# Patient Record
Sex: MALE | Race: OTHER
[De-identification: names, ages, dates, MRNs, and addresses within clinical notes are randomized per-mention and may not be internally consistent; named-entity substitution may affect disease eponyms.]

---

## 2020-10-13 ENCOUNTER — HOSPITAL ENCOUNTER (OUTPATIENT)
Dept: HOSPITAL 76 - DI | Age: 70
Discharge: HOME | End: 2020-10-13
Attending: STUDENT IN AN ORGANIZED HEALTH CARE EDUCATION/TRAINING PROGRAM
Payer: MEDICARE

## 2020-10-13 DIAGNOSIS — K57.30: Primary | ICD-10-CM

## 2020-10-13 PROCEDURE — 74177 CT ABD & PELVIS W/CONTRAST: CPT

## 2020-10-13 NOTE — CT REPORT
PROCEDURE:  Abdomen/Pelvis W

 

INDICATIONS:  LLQ ABD PAIN

 

CONTRAST:  IV CONTRAST: Optiray 320 ml: 100 PO CONTRAST: Optiray 320 ml50

 

TECHNIQUE:  

After the administration of IV and oral contrast, 5 mm thick sections acquired from the diaphragms to
 the symphysis.  5 mm thick coronal and sagittal reformats were acquired.  For radiation dose reducti
on, the following was used:  automated exposure control, adjustment of mA and/or kV according to chanda
ent size.  

 

COMPARISON:  None.

 

FINDINGS:  

Image quality:  Excellent.  

 

ABDOMEN:  

Lung bases:  Lung bases are clear.  Heart size is normal.  

 

Solid organs:  Liver and spleen are normal in size and enhancement.  Gallbladder is within normal de leon
its  Biliary system is non dilated.  Pancreas enhances normally.  No adrenal nodules.  Kidneys demons
trate normal size and enhancement, without hydronephrosis.  

 

Peritoneum and bowel: There is diverticulosis of the descending and sigmoid colon. Bowel loops demons
trate otherwise normal wall thickness and caliber.  Normal appendix. No free fluid or air.  

 

Nodes and vessels:  No retroperitoneal or mesenteric adenopathy by size criteria.  Aorta and inferior
 vena cava are normal in size.  

 

Miscellaneous:  No ventral hernias.  

 

 

PELVIS:  

Genitourinary:  Bladder wall thickness is normal.  

 

Miscellaneous:  No inguinal hernias or adenopathy.  

 

Bones:  No suspicious bony lesions.  No vertebral body compression fractures.  

 

IMPRESSION:  

1. No acute process.

2. Normal appendix.

3. Colonic diverticulosis with no evidence of acute diverticulitis.

 

Reviewed by: Sagrario Salazar MD on 10/13/2020 4:10 PM PDT

Approved by: Sagrario Salazar MD on 10/13/2020 4:10 PM PDT

 

 

Station ID:  [id number]

## 2020-11-18 ENCOUNTER — HOSPITAL ENCOUNTER (OUTPATIENT)
Dept: HOSPITAL 76 - SDS | Age: 70
Discharge: HOME | End: 2020-11-18
Attending: SURGERY
Payer: MEDICARE

## 2020-11-18 ENCOUNTER — HOSPITAL ENCOUNTER (OUTPATIENT)
Dept: HOSPITAL 76 - EMS | Age: 70
Discharge: ANOTHER HOSPITAL | End: 2020-11-18
Attending: SURGERY
Payer: MEDICARE

## 2020-11-18 ENCOUNTER — HOSPITAL ENCOUNTER (OUTPATIENT)
Dept: HOSPITAL 76 - ED | Age: 70
LOS: 1 days | Discharge: HOME | End: 2020-11-19
Attending: SURGERY
Payer: MEDICARE

## 2020-11-18 VITALS — DIASTOLIC BLOOD PRESSURE: 79 MMHG | SYSTOLIC BLOOD PRESSURE: 160 MMHG

## 2020-11-18 DIAGNOSIS — Z20.828: ICD-10-CM

## 2020-11-18 DIAGNOSIS — R11.0: ICD-10-CM

## 2020-11-18 DIAGNOSIS — K57.30: ICD-10-CM

## 2020-11-18 DIAGNOSIS — K64.8: ICD-10-CM

## 2020-11-18 DIAGNOSIS — K62.5: Primary | ICD-10-CM

## 2020-11-18 DIAGNOSIS — E66.9: ICD-10-CM

## 2020-11-18 DIAGNOSIS — F17.210: ICD-10-CM

## 2020-11-18 DIAGNOSIS — K62.1: ICD-10-CM

## 2020-11-18 DIAGNOSIS — D12.5: ICD-10-CM

## 2020-11-18 DIAGNOSIS — D12.5: Primary | ICD-10-CM

## 2020-11-18 DIAGNOSIS — K91.840: Primary | ICD-10-CM

## 2020-11-18 DIAGNOSIS — I10: ICD-10-CM

## 2020-11-18 LAB
ALBUMIN DIAFP-MCNC: 3.8 G/DL (ref 3.2–5.5)
ALBUMIN/GLOB SERPL: 1.2 {RATIO} (ref 1–2.2)
ALP SERPL-CCNC: 44 IU/L (ref 42–121)
ALT SERPL W P-5'-P-CCNC: 18 IU/L (ref 10–60)
ANION GAP SERPL CALCULATED.4IONS-SCNC: 12 MMOL/L (ref 6–13)
AST SERPL W P-5'-P-CCNC: 17 IU/L (ref 10–42)
BASOPHILS NFR BLD AUTO: 0.1 10^3/UL (ref 0–0.1)
BASOPHILS NFR BLD AUTO: 0.4 %
BILIRUB BLD-MCNC: 0.6 MG/DL (ref 0.2–1)
BUN SERPL-MCNC: 19 MG/DL (ref 6–20)
C PNEUM DNA NPH QL NAA+NON-PROBE: NOT DETECTED
CALCIUM UR-MCNC: 8.7 MG/DL (ref 8.5–10.3)
CHLORIDE SERPL-SCNC: 104 MMOL/L (ref 101–111)
CO2 SERPL-SCNC: 22 MMOL/L (ref 21–32)
CREAT SERPLBLD-SCNC: 1.3 MG/DL (ref 0.6–1.2)
EOSINOPHIL # BLD AUTO: 0.1 10^3/UL (ref 0–0.7)
EOSINOPHIL NFR BLD AUTO: 0.6 %
ERYTHROCYTE [DISTWIDTH] IN BLOOD BY AUTOMATED COUNT: 13.2 % (ref 12–15)
GLOBULIN SER-MCNC: 3.3 G/DL (ref 2.1–4.2)
GLUCOSE SERPL-MCNC: 163 MG/DL (ref 70–100)
HGB UR QL STRIP: 12.4 G/DL (ref 14–18)
INR PPP: 1.1 (ref 0.8–1.2)
LIPASE SERPL-CCNC: 28 U/L (ref 22–51)
LYMPHOCYTES # SPEC AUTO: 5.6 10^3/UL (ref 1.5–3.5)
LYMPHOCYTES NFR BLD AUTO: 34.8 %
MANUAL DIF COMMENT BLD-IMP: (no result)
MCH RBC QN AUTO: 32 PG (ref 27–31)
MCHC RBC AUTO-ENTMCNC: 32.1 G/DL (ref 32–36)
MCV RBC AUTO: 99.7 FL (ref 80–94)
MONOCYTES # BLD AUTO: 1 10^3/UL (ref 0–1)
MONOCYTES NFR BLD AUTO: 5.9 %
NEUTROPHILS # BLD AUTO: 9.3 10^3/UL (ref 1.5–6.6)
NEUTROPHILS # SNV AUTO: 16.1 X10^3/UL (ref 4.8–10.8)
NEUTROPHILS NFR BLD AUTO: 57.4 %
PDW BLD AUTO: 9.4 FL (ref 7.4–11.4)
PLATELET # BLD: 265 10^3/UL (ref 130–450)
PLATELET BLD QL SMEAR: (no result)
PROT SPEC-MCNC: 7.1 G/DL (ref 6.7–8.2)
PROTHROM ACT/NOR PPP: 12 SECS (ref 9.9–12.6)
RBC MAR: 3.87 10^6/UL (ref 4.7–6.1)
RBC MORPH BLD: (no result)
SODIUM SERPLBLD-SCNC: 138 MMOL/L (ref 135–145)

## 2020-11-18 PROCEDURE — 87631 RESP VIRUS 3-5 TARGETS: CPT

## 2020-11-18 PROCEDURE — 0DBN8ZZ EXCISION OF SIGMOID COLON, VIA NATURAL OR ARTIFICIAL OPENING ENDOSCOPIC: ICD-10-PCS | Performed by: SURGERY

## 2020-11-18 PROCEDURE — 83690 ASSAY OF LIPASE: CPT

## 2020-11-18 PROCEDURE — 74021 RADEX ABDOMEN 3+ VIEWS: CPT

## 2020-11-18 PROCEDURE — 0202U NFCT DS 22 TRGT SARS-COV-2: CPT

## 2020-11-18 PROCEDURE — 86900 BLOOD TYPING SEROLOGIC ABO: CPT

## 2020-11-18 PROCEDURE — 80053 COMPREHEN METABOLIC PANEL: CPT

## 2020-11-18 PROCEDURE — 85025 COMPLETE CBC W/AUTO DIFF WBC: CPT

## 2020-11-18 PROCEDURE — 86901 BLOOD TYPING SEROLOGIC RH(D): CPT

## 2020-11-18 PROCEDURE — 86850 RBC ANTIBODY SCREEN: CPT

## 2020-11-18 PROCEDURE — 36415 COLL VENOUS BLD VENIPUNCTURE: CPT

## 2020-11-18 PROCEDURE — 45382 COLONOSCOPY W/CONTROL BLEED: CPT

## 2020-11-18 PROCEDURE — 85610 PROTHROMBIN TIME: CPT

## 2020-11-18 NOTE — ANESTHESIA
Pre-Anesthesia VS, & Labs





- Diagnosis





Acute GI bleed, S/P colonoscopy polypectomy





- Procedure





Colonoscopy


Vital Signs: 





                                        











Temp Pulse Resp BP Pulse Ox


 


 37.1 C   95   14   140/83 H  99 


 


 11/18/20 20:40  11/18/20 21:56  11/18/20 21:56  11/18/20 21:56  11/18/20 21:56











Height: 5 ft 4 in


Weight (kg): 83.007 kg


Body Mass Index: 31.4


BMI Classification: Obese





- NPO


>8 hours





- Lab Results


Current Lab Results: 





Laboratory Tests





11/18/20 20:50: PT 12.0, INR 1.1


11/18/20 20:50: Blood Type A POSITIVE, Antibody Screen NEGATIVE


11/18/20 20:47: Sodium 138, Potassium 4.2, Chloride 104, Carbon Dioxide 22, 

Anion Gap 12.0, BUN 19, Creatinine 1.3 H, Estimated GFR (MDRD) 54 L, Glucose 163

H, Calcium 8.7, Total Bilirubin 0.6, AST 17, ALT 18, Alkaline Phosphatase 44, 

Total Protein 7.1, Albumin 3.8, Globulin 3.3, Albumin/Globulin Ratio 1.2, Lipase

28


11/18/20 20:47: WBC 16.1 H, RBC 3.87 L, Hgb 12.4 L, Hct 38.6 L, MCV 99.7 H, MCH 

32.0 H, MCHC 32.1, RDW 13.2, Plt Count 265, MPV 9.4, Neut # (Auto) 9.3 H, Lymph 

# (Auto) 5.6 H, Mono # (Auto) 1.0, Eos # (Auto) 0.1, Baso # (Auto) 0.1, Absolute

Nucleated RBC 0.00, Band Neuts % (Manual) Not Reportable, Abnorm Lymph % 

(Manual) Not Reportable, Nucleated RBC % 0.0, Neutrophils # (Manual) Not Rep

ortable, Lymphocytes # (Manual) Not Reportable, Monocytes # (Manual) Not 

Reportable, Eosinophils # (Manual) Not Reportable, Basophils # (Manual) Not 

Reportable, Differential Comment MANUAL=AUTO DIFF, Manual Slide Review 

Indicated, Platelet Estimate NORMAL (130-450,000), RBC Morph Micro Appear NORMAL

APPEARANCE








Fish Bones: 


                                 11/18/20 20:47





                                 11/18/20 20:47





Home Medications and Allergies





                                        





Finasteride 5 mg PO DAILY 09/08/17 


Tamsulosin HCl [Flomax] 0.4 mg ORAL DAILY 09/08/17 


lisinopriL [Lisinopril] 10 mg ORAL DAILY 09/08/17 


FLUoxetine [PROzac] 20 mg PO DAILY 11/18/20 








Allergies/Adverse Reactions: 


                                    Allergies











Allergy/AdvReac Type Severity Reaction Status Date / Time


 


codeine Allergy  Nausea Verified 11/18/20 20:51














Anes History & Medical History





- Anesthetic History


Anesthesia Complications: reports: No previous complications


Family history of Anesthesia Complications: Denies


Family history of Malignant Hyperthermia: Denies





- Medical History


Cardiovascular: reports: Hypertension


Pulmonary: reports: None


Gastrointestinal: reports: Hepatitis


Urinary: reports: Benign prostate hypertrophy


Musculoskeletal: reports: Other


Endocrine/Autoimmune: reports: None


Skin: reports: None


Smoking Status: Current every day smoker


Psychosocial: reports: Depression, Anxiety (PTSD), Substance abuse, Other 

([place])





- Surgical History


General: Colonoscopy





Exam


General: Alert, Oriented x3, Cooperative


Dental: Dentures full Upper, Partials Lower, Poor dentition, Other (Missing 

teeth lower)


Mouth Opening: 3 Fingerbreadth


Neck Mobility: Reduced


Mallampati classification: III


Thyromental Distance: 4-6 cm


Respiratory: Lungs clear





Plan


Anesthesia Type: MAC


Consent for Procedure(s) Verified and Reviewed: Yes


Code Status: Attempt Resuscitation


ASA classification: 2-Mild systemic disease


Is this case an emergency?: Yes (Anesthesia plan explained, consent signed.)

## 2020-11-18 NOTE — ED PHYSICIAN DOCUMENTATION
PD HPI GI BLEED





- Stated complaint


Stated Complaint: GI BLEED





- Chief complaint


Chief Complaint: Abd Pain





- History obtained from


History obtained from: Patient





- Additional information


Additional information: 


Pt is brought by EMS after onset of fior blood per rectum this afternoon. 

Patient states he had a colonoscopy today with Dr. Delacruz and had a couple of 

polyps removed and some biopsies taken. The patient states that he felt fine 

after the colonoscopy, and went home and ate some yogurt and a small bit of 

other food. He felt the urge to have a bowel movement and when he did, he 

noticed fior blood with clots. Patient states it is hard to quantify exactly 

how much came out, but the the toilet bowl turned red. Patient states this is 

happened a total of 6 times between afternoon and evening. He states that what 

really concerned him was that he suddenly became lightheaded and nauseated and 

actually vomited.  Patient states his wife told him he looked very pale at this 

point in time.  He also found a friend who had just had a large mass removed 

from his colon, and when his friend told him that he did not have any heavy 

bleeding, but only some minor residue with his stools, the patient became very 

concerned. She is no longer feeling lightheaded or nauseated, but medics to 

report the patient has been tachycardic in route in the 110's. The patient is 

not on any anticoagulants. He got the colonoscopy in the first place not for 

bleeding, but because he had had some change in his bowel habits and he was 

nearly due for colonoscopy anyway, so his doctor set this up for him. Patient 

states the procedure was done here.








Review of Systems


Ten Systems: 10 systems reviewed and negative


Constitutional: reports: Reviewed and negative


Eyes: reports: Reviewed and negative


Ears: reports: Reviewed and negative


Nose: reports: Reviewed and negative


Throat: reports: Reviewed and negative


Cardiac: reports: Reviewed and negative


Respiratory: reports: Reviewed and negative


GI: reports: Nausea, Vomiting, Bloody / black stool.  denies: Abdominal Pain, 

Hematemesis


: reports: Reviewed and negative


Skin: reports: Reviewed and negative


Musculoskeletal: reports: Reviewed and negative


Neurologic: reports: Reviewed and negative


Psychiatric: reports: Reviewed and negative


Endocrine: reports: Reviewed and negative


Immunocompromised: reports: Reviewed and negative





PD PAST MEDICAL HISTORY





- Past Medical History


Cardiovascular: Hypertension


Respiratory: None


Endocrine/Autoimmune: None


GI: Hepatitis


: Benign prostate hypertrophy


HEENT: Chronic vision loss


Psych: Depression, Post traumatic stress disorder


Musculoskeletal: Other


Derm: None





- Past Surgical History


Past Surgical History: Yes


General: Colonoscopy





- Present Medications


Home Medications: 


                                Ambulatory Orders











 Medication  Instructions  Recorded  Confirmed


 


Finasteride 5 mg PO DAILY 09/08/17 11/17/20


 


Tamsulosin HCl [Flomax] 0.4 mg ORAL DAILY 09/08/17 11/17/20


 


lisinopriL [Lisinopril] 10 mg ORAL DAILY 09/08/17 11/17/20


 


FLUoxetine [PROzac] 20 mg PO DAILY 11/18/20 11/18/20














- Allergies


Allergies/Adverse Reactions: 


                                    Allergies











Allergy/AdvReac Type Severity Reaction Status Date / Time


 


codeine Allergy  Nausea Verified 11/18/20 20:51














- Social History


Does the pt smoke?: No


Smoking Status: Never smoker





PD ED PE NORMAL





- Vitals


Vital signs reviewed: Yes





- General


General: Alert and oriented X 3, No acute distress





- HEENT


HEENT: Atraumatic, PERRL, EOMI, Moist mucous membranes





- Neck


Neck: Supple, no meningeal sign





- Cardiac


Cardiac: RRR, No murmur, Strong equal pulses





- Respiratory


Respiratory: No respiratory distress, Clear bilaterally





- Abdomen


Abdomen: Soft, Non tender, Non distended





- Back


Back: No CVA TTP





- Derm


Derm: Normal color, Warm and dry, No rash





- Extremities


Extremities: No deformity, No edema





- Neuro


Neuro: Alert and oriented X 3, Other (The normal)





- Psych


Psych: Normal mood, Normal affect





Results





- Vitals


Vitals: 


                               Vital Signs - 24 hr











  11/18/20 11/18/20 11/18/20





  20:40 21:17 21:56


 


Temperature 37.1 C  


 


Heart Rate 116 H 91 95


 


Respiratory 19 18 14





Rate   


 


Blood Pressure 158/107 H 111/87 H 140/83 H


 


O2 Saturation 100 100 99








                                     Oxygen











O2 Source                      Room air

















- Labs


Labs: 


                                Laboratory Tests











  11/18/20 11/18/20 11/18/20





  20:47 20:47 20:50


 


WBC  16.1 H  


 


RBC  3.87 L  


 


Hgb  12.4 L  


 


Hct  38.6 L  


 


MCV  99.7 H  


 


MCH  32.0 H  


 


MCHC  32.1  


 


RDW  13.2  


 


Plt Count  265  


 


MPV  9.4  


 


Neut # (Auto)  9.3 H  


 


Lymph # (Auto)  5.6 H  


 


Mono # (Auto)  1.0  


 


Eos # (Auto)  0.1  


 


Baso # (Auto)  0.1  


 


Absolute Nucleated RBC  0.00  


 


Band Neuts % (Manual)  Not Reportable  


 


Abnorm Lymph % (Manual)  Not Reportable  


 


Nucleated RBC %  0.0  


 


Neutrophils # (Manual)  Not Reportable  


 


Lymphocytes # (Manual)  Not Reportable  


 


Monocytes # (Manual)  Not Reportable  


 


Eosinophils # (Manual)  Not Reportable  


 


Basophils # (Manual)  Not Reportable  


 


Differential Comment  MANUAL=AUTO DIFF  


 


Manual Slide Review  Indicated  


 


Platelet Estimate  NORMAL (130-450,000)  


 


RBC Morph Micro Appear  NORMAL APPEARANCE  


 


PT   


 


INR   


 


Sodium   138 


 


Potassium   4.2 


 


Chloride   104 


 


Carbon Dioxide   22 


 


Anion Gap   12.0 


 


BUN   19 


 


Creatinine   1.3 H 


 


Estimated GFR (MDRD)   54 L 


 


Glucose   163 H 


 


Calcium   8.7 


 


Total Bilirubin   0.6 


 


AST   17 


 


ALT   18 


 


Alkaline Phosphatase   44 


 


Total Protein   7.1 


 


Albumin   3.8 


 


Globulin   3.3 


 


Albumin/Globulin Ratio   1.2 


 


Lipase   28 


 


Blood Type    A POSITIVE


 


Antibody Screen    NEGATIVE














  11/18/20





  20:50


 


WBC 


 


RBC 


 


Hgb 


 


Hct 


 


MCV 


 


MCH 


 


MCHC 


 


RDW 


 


Plt Count 


 


MPV 


 


Neut # (Auto) 


 


Lymph # (Auto) 


 


Mono # (Auto) 


 


Eos # (Auto) 


 


Baso # (Auto) 


 


Absolute Nucleated RBC 


 


Band Neuts % (Manual) 


 


Abnorm Lymph % (Manual) 


 


Nucleated RBC % 


 


Neutrophils # (Manual) 


 


Lymphocytes # (Manual) 


 


Monocytes # (Manual) 


 


Eosinophils # (Manual) 


 


Basophils # (Manual) 


 


Differential Comment 


 


Manual Slide Review 


 


Platelet Estimate 


 


RBC Morph Micro Appear 


 


PT  12.0


 


INR  1.1


 


Sodium 


 


Potassium 


 


Chloride 


 


Carbon Dioxide 


 


Anion Gap 


 


BUN 


 


Creatinine 


 


Estimated GFR (MDRD) 


 


Glucose 


 


Calcium 


 


Total Bilirubin 


 


AST 


 


ALT 


 


Alkaline Phosphatase 


 


Total Protein 


 


Albumin 


 


Globulin 


 


Albumin/Globulin Ratio 


 


Lipase 


 


Blood Type 


 


Antibody Screen 














PD MEDICAL DECISION MAKING





- ED course


Complexity details: reviewed results, re-evaluated patient, considered 

differential, d/w patient


ED course: 


The patient was started on IV fluids, as he was found to be tachycardic here in 

the emergency department. He was not hypotensive. He was worked up with labs, 

including CBC, CMP, PT/INR, and type and screen.  The patient's heart rate 

improved greatly with fluids and was found to be normal on re-evaluation.  His 

blood pressure remained stable throughout his stay in the emergency department. 

CBC showed a hemoglobin of 12.4, and no comparisons were available in the 

system.  I spoke with Dr. Galaviz, who is on-call for surgery, and she did call the

patient's surgeon, Dr. Buenrostro, who is out of our system.  He did come and see 

the patient, and decided to take him back to the OR for another scope.








Departure





- Departure


Disposition: ED Transfer to [facility]


Clinical Impression: 


 Lower GI bleed





Condition: Serious

## 2020-11-19 VITALS — SYSTOLIC BLOOD PRESSURE: 138 MMHG | DIASTOLIC BLOOD PRESSURE: 54 MMHG

## 2020-11-19 LAB
BASOPHILS NFR BLD AUTO: 0.1 10^3/UL (ref 0–0.1)
BASOPHILS NFR BLD AUTO: 0.3 %
EOSINOPHIL # BLD AUTO: 0 10^3/UL (ref 0–0.7)
EOSINOPHIL NFR BLD AUTO: 0.1 %
ERYTHROCYTE [DISTWIDTH] IN BLOOD BY AUTOMATED COUNT: 13.2 % (ref 12–15)
HGB UR QL STRIP: 9.4 G/DL (ref 14–18)
LYMPHOCYTES # SPEC AUTO: 1.9 10^3/UL (ref 1.5–3.5)
LYMPHOCYTES NFR BLD AUTO: 12.7 %
MCH RBC QN AUTO: 31.3 PG (ref 27–31)
MCHC RBC AUTO-ENTMCNC: 31.6 G/DL (ref 32–36)
MCV RBC AUTO: 99 FL (ref 80–94)
MONOCYTES # BLD AUTO: 0.9 10^3/UL (ref 0–1)
MONOCYTES NFR BLD AUTO: 6 %
NEUTROPHILS # BLD AUTO: 11.8 10^3/UL (ref 1.5–6.6)
NEUTROPHILS # SNV AUTO: 14.7 X10^3/UL (ref 4.8–10.8)
NEUTROPHILS NFR BLD AUTO: 80.1 %
PDW BLD AUTO: 9.6 FL (ref 7.4–11.4)
PLATELET # BLD: 183 10^3/UL (ref 130–450)
RBC MAR: 3 10^6/UL (ref 4.7–6.1)

## 2020-11-19 NOTE — PROVIDER PROGRESS NOTE
Subjective





- General


Procedure Date: 11/18/20


Post Op Days: 1


Procedure Performed: Colonoscopy to evaluate and stop bleeding





- Review of Systems


General: positive: No symptoms


HEENT: positive: No symptoms


Pulmonary: positive: No symptoms


Cardiovascular: positive: No symptoms


Gastrointestinal: positive: Abdominal pain (Minimal on left side.  Passing a lot

of gas.  No more passage of blood.  2 BMs have been clear.)


Musculoskeletal: positive: No symptoms


Skin: positive: No symptoms


Psychiatric: positive: No symptoms





Objective





- Patient Data


Vital Signs: 





                                Vital Signs x48h











  Temp Pulse Resp BP Pulse Ox


 


 11/19/20 11:59  37.5 C  75  18  138/54 H  96


 


 11/19/20 07:40  37.5 C  85  18  154/63 H  96











Weight: 





                                     Weight











 11/17/20 11/18/20 11/19/20





 23:59 23:59 23:59


 


Weight (kg)  83.007 kg 83.007 kg











Intake & Output: 





                          Intake and Output Totals x24h











 11/17/20 11/18/20 11/19/20





 23:59 23:59 23:59


 


Intake Total  1000 700


 


Output Total   100


 


Balance  1000 600














- Lab Results


Lab Results: 


                                 11/19/20 04:50





                                 11/18/20 20:47


Other Lab Results: 





                               Lab Results x24hrs











  11/19/20 11/18/20 11/18/20 Range/Units





  04:50 22:15 20:50 


 


WBC  14.7 H    (4.8-10.8)  x10^3/uL


 


RBC  3.00 L    (4.70-6.10)  10^6/uL


 


Hgb  9.4 L    (14.0-18.0)  g/dL


 


Hct  29.7 L    (42.0-52.0)  %


 


MCV  99.0 H    (80.0-94.0)  fL


 


MCH  31.3 H    (27.0-31.0)  pg


 


MCHC  31.6 L    (32.0-36.0)  g/dL


 


RDW  13.2    (12.0-15.0)  %


 


Plt Count  183    (130-450)  10^3/uL


 


MPV  9.6    (7.4-11.4)  fL


 


Neut # (Auto)  11.8 H    (1.5-6.6)  10^3/uL


 


Lymph # (Auto)  1.9    (1.5-3.5)  10^3/uL


 


Mono # (Auto)  0.9    (0.0-1.0)  10^3/uL


 


Eos # (Auto)  0.0    (0.0-0.7)  10^3/uL


 


Baso # (Auto)  0.1    (0.0-0.1)  10^3/uL


 


Absolute Nucleated RBC  0.00    x10^3/uL


 


Band Neuts % (Manual)     


 


Abnorm Lymph % (Manual)     


 


Nucleated RBC %  0.0    /100WBC


 


Neutrophils # (Manual)     


 


Lymphocytes # (Manual)     


 


Monocytes # (Manual)     


 


Eosinophils # (Manual)     


 


Basophils # (Manual)     


 


Differential Comment     


 


Manual Slide Review     


 


Platelet Estimate     (NORMAL)  


 


RBC Morph Micro Appear     (NORMAL)  


 


PT    12.0  (9.9-12.6)  secs


 


INR    1.1  (0.8-1.2)  


 


Sodium     (135-145)  mmol/L


 


Potassium     (3.5-5.0)  mmol/L


 


Chloride     (101-111)  mmol/L


 


Carbon Dioxide     (21-32)  mmol/L


 


Anion Gap     (6-13)  


 


BUN     (6-20)  mg/dL


 


Creatinine     (0.6-1.2)  mg/dL


 


Estimated GFR (MDRD)     (>89)  


 


Glucose     ()  mg/dL


 


Calcium     (8.5-10.3)  mg/dL


 


Total Bilirubin     (0.2-1.0)  mg/dL


 


AST     (10-42)  IU/L


 


ALT     (10-60)  IU/L


 


Alkaline Phosphatase     ()  IU/L


 


Total Protein     (6.7-8.2)  g/dL


 


Albumin     (3.2-5.5)  g/dL


 


Globulin     (2.1-4.2)  g/dL


 


Albumin/Globulin Ratio     (1.0-2.2)  


 


Lipase     (22-51)  U/L


 


Nasal Adenovirus (PCR)   NOT DETECTED   


 


Nasal B. parapertussis DNA (PCR)   NOT DETECTED   


 


Nasal Coronavir 229E PCR   NOT DETECTED   


 


Nasal Coronavir HKU1 PCR   NOT DETECTED   


 


Nasal Coronavir NL63 PCR   NOT DETECTED   


 


Nasal Coronavir OC43 PCR   NOT DETECTED   


 


Nasal Enterovir/Rhinovir PCR   NOT DETECTED   


 


Nasal Influenza B PCR   NOT DETECTED   


 


Nasal Influenza A PCR   NOT DETECTED   


 


Nasal Parainfluen 1 PCR   NOT DETECTED   


 


Nasal Parainfluen 2 PCR   NOT DETECTED   


 


Nasal Parainfluen 3 PCR   NOT DETECTED   


 


Nasal Parainfluen 4 PCR   NOT DETECTED   


 


Nasal RSV (PCR)   NOT DETECTED   


 


Nasal B.pertussis DNA PCR   NOT DETECTED   


 


Nasal C.pneumoniae (PCR)   NOT DETECTED   


 


Moises Human Metapneumo PCR   NOT DETECTED   


 


Nasal M.pneumoniae (PCR)   NOT DETECTED   


 


Nasal SARS-CoV-2 (PCR)   NOT DETECTED   


 


Blood Type     


 


Antibody Screen     














  11/18/20 11/18/20 11/18/20 Range/Units





  20:50 20:47 20:47 


 


WBC    16.1 H  (4.8-10.8)  x10^3/uL


 


RBC    3.87 L  (4.70-6.10)  10^6/uL


 


Hgb    12.4 L  (14.0-18.0)  g/dL


 


Hct    38.6 L  (42.0-52.0)  %


 


MCV    99.7 H  (80.0-94.0)  fL


 


MCH    32.0 H  (27.0-31.0)  pg


 


MCHC    32.1  (32.0-36.0)  g/dL


 


RDW    13.2  (12.0-15.0)  %


 


Plt Count    265  (130-450)  10^3/uL


 


MPV    9.4  (7.4-11.4)  fL


 


Neut # (Auto)    9.3 H  (1.5-6.6)  10^3/uL


 


Lymph # (Auto)    5.6 H  (1.5-3.5)  10^3/uL


 


Mono # (Auto)    1.0  (0.0-1.0)  10^3/uL


 


Eos # (Auto)    0.1  (0.0-0.7)  10^3/uL


 


Baso # (Auto)    0.1  (0.0-0.1)  10^3/uL


 


Absolute Nucleated RBC    0.00  x10^3/uL


 


Band Neuts % (Manual)    Not Reportable  


 


Abnorm Lymph % (Manual)    Not Reportable  


 


Nucleated RBC %    0.0  /100WBC


 


Neutrophils # (Manual)    Not Reportable  


 


Lymphocytes # (Manual)    Not Reportable  


 


Monocytes # (Manual)    Not Reportable  


 


Eosinophils # (Manual)    Not Reportable  


 


Basophils # (Manual)    Not Reportable  


 


Differential Comment    MANUAL=AUTO DIFF  


 


Manual Slide Review    Indicated  


 


Platelet Estimate    NORMAL (130-450,000)  (NORMAL)  


 


RBC Morph Micro Appear    NORMAL APPEARANCE  (NORMAL)  


 


PT     (9.9-12.6)  secs


 


INR     (0.8-1.2)  


 


Sodium   138   (135-145)  mmol/L


 


Potassium   4.2   (3.5-5.0)  mmol/L


 


Chloride   104   (101-111)  mmol/L


 


Carbon Dioxide   22   (21-32)  mmol/L


 


Anion Gap   12.0   (6-13)  


 


BUN   19   (6-20)  mg/dL


 


Creatinine   1.3 H   (0.6-1.2)  mg/dL


 


Estimated GFR (MDRD)   54 L   (>89)  


 


Glucose   163 H   ()  mg/dL


 


Calcium   8.7   (8.5-10.3)  mg/dL


 


Total Bilirubin   0.6   (0.2-1.0)  mg/dL


 


AST   17   (10-42)  IU/L


 


ALT   18   (10-60)  IU/L


 


Alkaline Phosphatase   44   ()  IU/L


 


Total Protein   7.1   (6.7-8.2)  g/dL


 


Albumin   3.8   (3.2-5.5)  g/dL


 


Globulin   3.3   (2.1-4.2)  g/dL


 


Albumin/Globulin Ratio   1.2   (1.0-2.2)  


 


Lipase   28   (22-51)  U/L


 


Nasal Adenovirus (PCR)     


 


Nasal B. parapertussis DNA (PCR)     


 


Nasal Coronavir 229E PCR     


 


Nasal Coronavir HKU1 PCR     


 


Nasal Coronavir NL63 PCR     


 


Nasal Coronavir OC43 PCR     


 


Nasal Enterovir/Rhinovir PCR     


 


Nasal Influenza B PCR     


 


Nasal Influenza A PCR     


 


Nasal Parainfluen 1 PCR     


 


Nasal Parainfluen 2 PCR     


 


Nasal Parainfluen 3 PCR     


 


Nasal Parainfluen 4 PCR     


 


Nasal RSV (PCR)     


 


Nasal B.pertussis DNA PCR     


 


Nasal C.pneumoniae (PCR)     


 


Moises Human Metapneumo PCR     


 


Nasal M.pneumoniae (PCR)     


 


Nasal SARS-CoV-2 (PCR)     


 


Blood Type  A POSITIVE    


 


Antibody Screen  NEGATIVE    














- Current Medications


Current Medications: 





                               Current Medications











Generic Name Dose Route Start Last Admin





  Trade Name Freq  PRN Reason Stop Dose Admin


 


Lactated Ringer's  1,000 mls @ 83.333 mls/hr  11/19/20 02:00  11/19/20 07:40





  Lr  IV   83.333 mls/hr





  .Q12H VALARIE   Administration














Impression/Plan





- Problem List


Problem List: 


Abdominal x-ray reviewed and normal.  Patient enjoyed lunch - tolerated it well.

 No additional bloody bowel movements.  Explained vasovagal to him once more.  

Patient should definitely follow up with me to discuss pathology once it comes 

back.

## 2020-11-19 NOTE — XRAY REPORT
PROCEDURE:  Abdomen 3 View X-Ray

 

INDICATIONS:  LEFT sided abdominal pain s/p cscope x2

 

TECHNIQUE:  2 views of the abdomen were acquired.  

 

COMPARISON:  CT abdomen and pelvis dated 10/13/2020

 

FINDINGS:  

Surgical changes and devices:  None.  

 

Bowel:  No radiographic evidence for pneumoperitoneum.  The bowel gas pattern is nonobstructive.  

 

Soft tissues:  No masses; visualized solid organ contours appear normal in size.  No suspicious abdom
inal calcifications.  

 

Bones:  No suspicious bony abnormalities.  Degenerative changes of the lumbar spine.

 

IMPRESSION:  

No radiographic evidence for pneumoperitoneum.

Nonobstructive bowel gas pattern.

 

Reviewed by: Gerardo Pascal MD on 11/19/2020 12:18 PM PST

Approved by: Gerardo Pascal MD on 11/19/2020 12:18 PM PST

 

 

Station ID:  [id number]

## 2020-11-19 NOTE — PROVIDER PROGRESS NOTE
Subjective





- General


Procedure Date: 11/18/20


Post Op Days: 1


Procedure Performed: Colonoscopy to evaluate and stop bleeding





- Review of Systems


General: positive: No symptoms


HEENT: positive: No symptoms


Pulmonary: positive: No symptoms


Cardiovascular: positive: No symptoms


Gastrointestinal: positive: Abdominal pain (Minimal on left side.  Passing a lot

of gas.  No more passage of blood.  2 BMs have been clear.)


Musculoskeletal: positive: No symptoms


Skin: positive: No symptoms


Psychiatric: positive: No symptoms





Objective





- Patient Data


Reviewed Vital Signs: Yes


Vital Signs: 





                                Vital Signs x48h











  Temp Pulse Resp BP Pulse Ox


 


 11/19/20 07:40  37.5 C  85  18  154/63 H  96


 


 11/19/20 05:47  37.4 C    


 


 11/19/20 05:24  37.6 C H    


 


 11/19/20 04:57  36.6 C  77  18  136/77 H  93


 


 11/19/20 02:53   79  20  152/58 H  98











Weight: 





                                     Weight











 11/17/20 11/18/20 11/19/20





 23:59 23:59 23:59


 


Weight (kg)  83.007 kg 83.007 kg











Intake & Output: 





                          Intake and Output Totals x24h











 11/17/20 11/18/20 11/19/20





 23:59 23:59 23:59


 


Intake Total  1000 280


 


Output Total   100


 


Balance  1000 180














- Lab Results


Lab Results: 


                                 11/19/20 04:50





                                 11/18/20 20:47


Other Lab Results: 





                               Lab Results x24hrs











  11/19/20 11/18/20 11/18/20 Range/Units





  04:50 22:15 20:50 


 


WBC  14.7 H    (4.8-10.8)  x10^3/uL


 


RBC  3.00 L    (4.70-6.10)  10^6/uL


 


Hgb  9.4 L    (14.0-18.0)  g/dL


 


Hct  29.7 L    (42.0-52.0)  %


 


MCV  99.0 H    (80.0-94.0)  fL


 


MCH  31.3 H    (27.0-31.0)  pg


 


MCHC  31.6 L    (32.0-36.0)  g/dL


 


RDW  13.2    (12.0-15.0)  %


 


Plt Count  183    (130-450)  10^3/uL


 


MPV  9.6    (7.4-11.4)  fL


 


Neut # (Auto)  11.8 H    (1.5-6.6)  10^3/uL


 


Lymph # (Auto)  1.9    (1.5-3.5)  10^3/uL


 


Mono # (Auto)  0.9    (0.0-1.0)  10^3/uL


 


Eos # (Auto)  0.0    (0.0-0.7)  10^3/uL


 


Baso # (Auto)  0.1    (0.0-0.1)  10^3/uL


 


Absolute Nucleated RBC  0.00    x10^3/uL


 


Band Neuts % (Manual)     


 


Abnorm Lymph % (Manual)     


 


Nucleated RBC %  0.0    /100WBC


 


Neutrophils # (Manual)     


 


Lymphocytes # (Manual)     


 


Monocytes # (Manual)     


 


Eosinophils # (Manual)     


 


Basophils # (Manual)     


 


Differential Comment     


 


Manual Slide Review     


 


Platelet Estimate     (NORMAL)  


 


RBC Morph Micro Appear     (NORMAL)  


 


PT    12.0  (9.9-12.6)  secs


 


INR    1.1  (0.8-1.2)  


 


Sodium     (135-145)  mmol/L


 


Potassium     (3.5-5.0)  mmol/L


 


Chloride     (101-111)  mmol/L


 


Carbon Dioxide     (21-32)  mmol/L


 


Anion Gap     (6-13)  


 


BUN     (6-20)  mg/dL


 


Creatinine     (0.6-1.2)  mg/dL


 


Estimated GFR (MDRD)     (>89)  


 


Glucose     ()  mg/dL


 


Calcium     (8.5-10.3)  mg/dL


 


Total Bilirubin     (0.2-1.0)  mg/dL


 


AST     (10-42)  IU/L


 


ALT     (10-60)  IU/L


 


Alkaline Phosphatase     ()  IU/L


 


Total Protein     (6.7-8.2)  g/dL


 


Albumin     (3.2-5.5)  g/dL


 


Globulin     (2.1-4.2)  g/dL


 


Albumin/Globulin Ratio     (1.0-2.2)  


 


Lipase     (22-51)  U/L


 


Nasal Adenovirus (PCR)   NOT DETECTED   


 


Nasal B. parapertussis DNA (PCR)   NOT DETECTED   


 


Nasal Coronavir 229E PCR   NOT DETECTED   


 


Nasal Coronavir HKU1 PCR   NOT DETECTED   


 


Nasal Coronavir NL63 PCR   NOT DETECTED   


 


Nasal Coronavir OC43 PCR   NOT DETECTED   


 


Nasal Enterovir/Rhinovir PCR   NOT DETECTED   


 


Nasal Influenza B PCR   NOT DETECTED   


 


Nasal Influenza A PCR   NOT DETECTED   


 


Nasal Parainfluen 1 PCR   NOT DETECTED   


 


Nasal Parainfluen 2 PCR   NOT DETECTED   


 


Nasal Parainfluen 3 PCR   NOT DETECTED   


 


Nasal Parainfluen 4 PCR   NOT DETECTED   


 


Nasal RSV (PCR)   NOT DETECTED   


 


Nasal B.pertussis DNA PCR   NOT DETECTED   


 


Nasal C.pneumoniae (PCR)   NOT DETECTED   


 


Moises Human Metapneumo PCR   NOT DETECTED   


 


Nasal M.pneumoniae (PCR)   NOT DETECTED   


 


Nasal SARS-CoV-2 (PCR)   NOT DETECTED   


 


Blood Type     


 


Antibody Screen     














  11/18/20 11/18/20 11/18/20 Range/Units





  20:50 20:47 20:47 


 


WBC    16.1 H  (4.8-10.8)  x10^3/uL


 


RBC    3.87 L  (4.70-6.10)  10^6/uL


 


Hgb    12.4 L  (14.0-18.0)  g/dL


 


Hct    38.6 L  (42.0-52.0)  %


 


MCV    99.7 H  (80.0-94.0)  fL


 


MCH    32.0 H  (27.0-31.0)  pg


 


MCHC    32.1  (32.0-36.0)  g/dL


 


RDW    13.2  (12.0-15.0)  %


 


Plt Count    265  (130-450)  10^3/uL


 


MPV    9.4  (7.4-11.4)  fL


 


Neut # (Auto)    9.3 H  (1.5-6.6)  10^3/uL


 


Lymph # (Auto)    5.6 H  (1.5-3.5)  10^3/uL


 


Mono # (Auto)    1.0  (0.0-1.0)  10^3/uL


 


Eos # (Auto)    0.1  (0.0-0.7)  10^3/uL


 


Baso # (Auto)    0.1  (0.0-0.1)  10^3/uL


 


Absolute Nucleated RBC    0.00  x10^3/uL


 


Band Neuts % (Manual)    Not Reportable  


 


Abnorm Lymph % (Manual)    Not Reportable  


 


Nucleated RBC %    0.0  /100WBC


 


Neutrophils # (Manual)    Not Reportable  


 


Lymphocytes # (Manual)    Not Reportable  


 


Monocytes # (Manual)    Not Reportable  


 


Eosinophils # (Manual)    Not Reportable  


 


Basophils # (Manual)    Not Reportable  


 


Differential Comment    MANUAL=AUTO DIFF  


 


Manual Slide Review    Indicated  


 


Platelet Estimate    NORMAL (130-450,000)  (NORMAL)  


 


RBC Morph Micro Appear    NORMAL APPEARANCE  (NORMAL)  


 


PT     (9.9-12.6)  secs


 


INR     (0.8-1.2)  


 


Sodium   138   (135-145)  mmol/L


 


Potassium   4.2   (3.5-5.0)  mmol/L


 


Chloride   104   (101-111)  mmol/L


 


Carbon Dioxide   22   (21-32)  mmol/L


 


Anion Gap   12.0   (6-13)  


 


BUN   19   (6-20)  mg/dL


 


Creatinine   1.3 H   (0.6-1.2)  mg/dL


 


Estimated GFR (MDRD)   54 L   (>89)  


 


Glucose   163 H   ()  mg/dL


 


Calcium   8.7   (8.5-10.3)  mg/dL


 


Total Bilirubin   0.6   (0.2-1.0)  mg/dL


 


AST   17   (10-42)  IU/L


 


ALT   18   (10-60)  IU/L


 


Alkaline Phosphatase   44   ()  IU/L


 


Total Protein   7.1   (6.7-8.2)  g/dL


 


Albumin   3.8   (3.2-5.5)  g/dL


 


Globulin   3.3   (2.1-4.2)  g/dL


 


Albumin/Globulin Ratio   1.2   (1.0-2.2)  


 


Lipase   28   (22-51)  U/L


 


Nasal Adenovirus (PCR)     


 


Nasal B. parapertussis DNA (PCR)     


 


Nasal Coronavir 229E PCR     


 


Nasal Coronavir HKU1 PCR     


 


Nasal Coronavir NL63 PCR     


 


Nasal Coronavir OC43 PCR     


 


Nasal Enterovir/Rhinovir PCR     


 


Nasal Influenza B PCR     


 


Nasal Influenza A PCR     


 


Nasal Parainfluen 1 PCR     


 


Nasal Parainfluen 2 PCR     


 


Nasal Parainfluen 3 PCR     


 


Nasal Parainfluen 4 PCR     


 


Nasal RSV (PCR)     


 


Nasal B.pertussis DNA PCR     


 


Nasal C.pneumoniae (PCR)     


 


Moises Human Metapneumo PCR     


 


Nasal M.pneumoniae (PCR)     


 


Nasal SARS-CoV-2 (PCR)     


 


Blood Type  A POSITIVE    


 


Antibody Screen  NEGATIVE    














- Current Medications


Current Medications: 





                               Current Medications











Generic Name Dose Route Start Last Admin





  Trade Name Freq  PRN Reason Stop Dose Admin


 


Lactated Ringer's  1,000 mls @ 83.333 mls/hr  11/19/20 02:00  11/19/20 07:40





  Lr  IV   83.333 mls/hr





  .Q12H VALARIE   Administration














- Physical Exam


General Appearance: positive: No acute distress


Eyes Bilateral: positive: Normal inspection


ENT: positive: Dry mucous membranes (Slightly.)


Neck: positive: Trachea midline


Respiratory: positive: Chest non-tender, No respiratory distress, Breath sounds 

nml


Cardiovascular: positive: Regular rate & rhythm


Abdomen: positive: Tenderness (Slight on LEFT side.)


Skin: positive: Color nml


Extremities: positive: Non-tender


Neurologic/Psychiatric: positive: Oriented x3, Sensation nml, Mood/affect nml





Impression/Plan





- Problem List


Problem List: 


D1 s/p colonoscopy with hot snare removal of large polyp (35 cm) with 

malfunction of snare wire and cold biopsy of distal rectal polyp there was 

bleeding following the malfunction but in time the issue was resolved and the 

stump resected cauterized with no bleeding at the conclusion of the procedure.  

Blood with first 2-3 BMs was expected and patient was instructed as to this.





Patient then presented with rectal bleed and what sounds like a vasovagal 

episode





Due to possibility of continued bleeding patient was taken back to endoscopy 

suite so...





D1 s/p colonoscopy for evaluation and cessation of bleeding.  No active bleeding

was seen but to ensure hemostasis the base of the stump was injected with 4 mL 

thrombin/epinephrine mix.  Patient now has some residual LEFT sided pain so I wi

ll order 3 views of the abdomen.  Lack of fever, normal (for him) blood 

pressure, normal hearr rate all argue against perforation but I will check.  I 

will also start the patient on general diet.

## 2020-11-19 NOTE — ANESTHESIA POST OP EVALUATION
Anesthesia Post Eval





- Post Anesthesia Eval


Vitals: 





                                Last Vital Signs











Temp  37.1 C   11/18/20 20:40


 


Pulse  88   11/18/20 22:42


 


Resp  16   11/18/20 22:42


 


BP  145/79 H  11/18/20 22:42


 


Pulse Ox  99   11/18/20 22:42











CV Function Including HR & BP: positive: Stable


Pain Control: positive: Satisfactory


Nausea & Vomiting: positive: Negative


Mental Status: positive: Baseline


Respiratory Status: Airway Patent


Hydration Status: Satisfactory


Anesthesia Complications: positive: None (Awake and alert. Moved to unit bed for

overnight.)

## 2021-06-23 ENCOUNTER — HOSPITAL ENCOUNTER (OUTPATIENT)
Dept: HOSPITAL 76 - ED | Age: 71
LOS: 1 days | Discharge: HOME | End: 2021-06-24
Attending: SURGERY
Payer: MEDICARE

## 2021-06-23 DIAGNOSIS — N40.0: ICD-10-CM

## 2021-06-23 DIAGNOSIS — K61.31: Primary | ICD-10-CM

## 2021-06-23 DIAGNOSIS — H54.7: ICD-10-CM

## 2021-06-23 DIAGNOSIS — F17.200: ICD-10-CM

## 2021-06-23 DIAGNOSIS — I10: ICD-10-CM

## 2021-06-23 DIAGNOSIS — F43.10: ICD-10-CM

## 2021-06-23 DIAGNOSIS — K75.9: ICD-10-CM

## 2021-06-23 DIAGNOSIS — K64.9: ICD-10-CM

## 2021-06-23 DIAGNOSIS — Z79.899: ICD-10-CM

## 2021-06-23 DIAGNOSIS — E66.3: ICD-10-CM

## 2021-06-23 DIAGNOSIS — F32.9: ICD-10-CM

## 2021-06-23 LAB
ALBUMIN DIAFP-MCNC: 4 G/DL (ref 3.2–5.5)
ALBUMIN/GLOB SERPL: 1 {RATIO} (ref 1–2.2)
ALP SERPL-CCNC: 52 IU/L (ref 42–121)
ALT SERPL W P-5'-P-CCNC: 16 IU/L (ref 10–60)
ANION GAP SERPL CALCULATED.4IONS-SCNC: 8 MMOL/L (ref 6–13)
AST SERPL W P-5'-P-CCNC: 15 IU/L (ref 10–42)
BASOPHILS NFR BLD AUTO: 0 10^3/UL (ref 0–0.1)
BASOPHILS NFR BLD AUTO: 0.2 %
BILIRUB BLD-MCNC: 0.5 MG/DL (ref 0.2–1)
BUN SERPL-MCNC: 13 MG/DL (ref 6–20)
CALCIUM UR-MCNC: 9.2 MG/DL (ref 8.5–10.3)
CHLORIDE SERPL-SCNC: 101 MMOL/L (ref 101–111)
CLARITY UR REFRACT.AUTO: (no result)
CO2 SERPL-SCNC: 25 MMOL/L (ref 21–32)
CREAT SERPLBLD-SCNC: 1 MG/DL (ref 0.6–1.2)
EOSINOPHIL # BLD AUTO: 0 10^3/UL (ref 0–0.7)
EOSINOPHIL NFR BLD AUTO: 0.2 %
ERYTHROCYTE [DISTWIDTH] IN BLOOD BY AUTOMATED COUNT: 12.8 % (ref 12–15)
GFRSERPLBLD MDRD-ARVRAT: 73 ML/MIN/{1.73_M2} (ref 89–?)
GLOBULIN SER-MCNC: 4.1 G/DL (ref 2.1–4.2)
GLUCOSE SERPL-MCNC: 111 MG/DL (ref 70–100)
GLUCOSE UR QL STRIP.AUTO: NEGATIVE MG/DL
HCT VFR BLD AUTO: 43.9 % (ref 42–52)
HGB UR QL STRIP: 14.4 G/DL (ref 14–18)
KETONES UR QL STRIP.AUTO: NEGATIVE MG/DL
LIPASE SERPL-CCNC: 22 U/L (ref 22–51)
LYMPHOCYTES # SPEC AUTO: 1.7 10^3/UL (ref 1.5–3.5)
LYMPHOCYTES NFR BLD AUTO: 13.1 %
MCH RBC QN AUTO: 32.1 PG (ref 27–31)
MCHC RBC AUTO-ENTMCNC: 32.8 G/DL (ref 32–36)
MCV RBC AUTO: 98 FL (ref 80–94)
MONOCYTES # BLD AUTO: 0.9 10^3/UL (ref 0–1)
MONOCYTES NFR BLD AUTO: 7.1 %
NEUTROPHILS # BLD AUTO: 10.3 10^3/UL (ref 1.5–6.6)
NEUTROPHILS # SNV AUTO: 13 X10^3/UL (ref 4.8–10.8)
NEUTROPHILS NFR BLD AUTO: 78.8 %
NITRITE UR QL STRIP.AUTO: NEGATIVE
NRBC # BLD AUTO: 0 /100WBC
NRBC # BLD AUTO: 0 X10^3/UL
PDW BLD AUTO: 9.1 FL (ref 7.4–11.4)
PH UR STRIP.AUTO: 6 PH (ref 5–7.5)
PLATELET # BLD: 251 10^3/UL (ref 130–450)
POTASSIUM SERPL-SCNC: 3.9 MMOL/L (ref 3.5–5)
PROT SPEC-MCNC: 8.1 G/DL (ref 6.7–8.2)
PROT UR STRIP.AUTO-MCNC: NEGATIVE MG/DL
RBC # UR STRIP.AUTO: NEGATIVE /UL
RBC MAR: 4.48 10^6/UL (ref 4.7–6.1)
SODIUM SERPLBLD-SCNC: 134 MMOL/L (ref 135–145)
SP GR UR STRIP.AUTO: 1.02 (ref 1–1.03)
UROBILINOGEN UR QL STRIP.AUTO: (no result) E.U./DL
UROBILINOGEN UR STRIP.AUTO-MCNC: NEGATIVE MG/DL

## 2021-06-23 PROCEDURE — 99283 EMERGENCY DEPT VISIT LOW MDM: CPT

## 2021-06-23 PROCEDURE — 81003 URINALYSIS AUTO W/O SCOPE: CPT

## 2021-06-23 PROCEDURE — 46060 I&D ISCHIORECTAL/NTRMRL ABSC: CPT

## 2021-06-23 PROCEDURE — 74177 CT ABD & PELVIS W/CONTRAST: CPT

## 2021-06-23 PROCEDURE — 96374 THER/PROPH/DIAG INJ IV PUSH: CPT

## 2021-06-23 PROCEDURE — 87086 URINE CULTURE/COLONY COUNT: CPT

## 2021-06-23 PROCEDURE — 99285 EMERGENCY DEPT VISIT HI MDM: CPT

## 2021-06-23 PROCEDURE — 83690 ASSAY OF LIPASE: CPT

## 2021-06-23 PROCEDURE — 36415 COLL VENOUS BLD VENIPUNCTURE: CPT

## 2021-06-23 PROCEDURE — 81001 URINALYSIS AUTO W/SCOPE: CPT

## 2021-06-23 PROCEDURE — 85025 COMPLETE CBC W/AUTO DIFF WBC: CPT

## 2021-06-23 PROCEDURE — 80053 COMPREHEN METABOLIC PANEL: CPT

## 2021-06-23 RX ADMIN — KETOROLAC TROMETHAMINE SCH MG: 15 INJECTION, SOLUTION INTRAMUSCULAR; INTRAVENOUS at 19:00

## 2021-06-23 RX ADMIN — SODIUM CHLORIDE SCH MLS/HR: 9 INJECTION, SOLUTION INTRAVENOUS at 18:48

## 2021-06-23 NOTE — ANESTHESIA POST OP EVALUATION
Anesthesia Post Eval





- Post Anesthesia Eval


Vitals: 





                                Last Vital Signs











Temp  36.5 C   06/23/21 18:10


 


Pulse  73   06/23/21 18:20


 


Resp  20   06/23/21 18:20


 


BP  142/73 H  06/23/21 18:20


 


Pulse Ox  95   06/23/21 18:20











CV Function Including HR & BP: Stable


Pain Control: Satisfactory


Nausea & Vomiting: Negative


Mental Status: Baseline


Respiratory Status: Airway Patent


Hydration Status: Satisfactory


Anesthesia Complications: None

## 2021-06-23 NOTE — HISTORY & PHYSICAL EXAMINATION
HPI





- Admitted From


Admitted from: ED





- History Obtained From


Records Reviewed: RN notes reviewed


History obtained from: Patient


Exam limitations: No limitations





- History of Present Illness


Pain/Problem Location Description: anal pain


Severity at the worst: reports: Severe


Pain Quality: reports: Dull, Aching, Throbbing


Context-Pain started w/: reports: Exertion


Timing: reports: Gradual onset, Constant


Duration: reports: Hours:, Days: (7)


Improved with: reports: Nothing


Worsened by: reports: Movement, Palpation


Associated symptoms: reports: Diaphoresis, Nausea


HPI Comment/Other: 





Pleasant 71 year old man with a week of perianal pain that started after moving 

some boxes.  He reports he noticed changed in the perianal region about 2 weeks 

ago when he noted it was more difficult to pass stool and the stools were 

thinner.  Pain developed a week ago and it has become increasingly more severe. 

Night sweats but no definite fever at home. Has not been able to sleep due to 

pain. He has a personal history of colon polyps and his last colonoscopy was in 

November of 2020 with [facility].  He had polyps removed at each of his 

colonoscopies done three years apart.





PMH/PSH





- Past Medical History


Cardiovascular: positive: Hypertension


Respiratory: positive: None


Endocrine/Autoimmune: positive: None


GI: positive: Hepatitis


: positive: Benign prostate hypertrophy


HEENT: positive: Chronic vision loss


Psych: positive: Depression, Post traumatic stress disorder


Musculoskeletal: positive: Other


Derm: positive: None


MRSA Hx?: No





- Past Surgical History


General: positive: Colonoscopy





Social & Family Hx





- Social History


Does the pt smoke?: No


Smoking Status: Current every day smoker


Does the pt drink ETOH?: No


Does the pt have substance abuse?: No





- POLST


Patient has POLST: No





Meds/Allgy





- Home Medications


Home Medications: 


                                Ambulatory Orders











 Medication  Instructions  Recorded  Confirmed


 


Finasteride 5 mg PO DAILY 09/08/17 11/17/20


 


Tamsulosin HCl [Flomax] 0.4 mg ORAL DAILY 09/08/17 11/17/20


 


lisinopriL [Lisinopril] 10 mg ORAL DAILY 09/08/17 11/17/20


 


FLUoxetine [PROzac] 20 mg PO DAILY 11/18/20 11/18/20














- Allergies


Allergies/Adverse Reactions: 


                                    Allergies











Allergy/AdvReac Type Severity Reaction Status Date / Time


 


codeine Allergy  Nausea Verified 06/23/21 09:22














Review of Systems





- Constitutional


Constitutional: reports: Fatigue, Malaise





- Gastrointestinal


Gastrointestinal: reports: Change in bowel habits, Rectal bleeding, Other 

(rectal and anal pain)





- All Other Systems


All Other Systems: reports: Reviewed and negative





Exam





- Vital Signs


Reviewed Vital Signs: Yes


Vital Signs: 





                                Vital Signs x48h











  Temp Pulse Resp BP Pulse Ox


 


 06/23/21 14:18   63  14  108/64  93


 


 06/23/21 09:18  36.8 C  88  18  174/78 H  95














- Physical Exam


General Appearance: positive: Alert, Mild distress


Eyes Bilateral: positive: Normal inspection, PERRL, EOMI


ENT: positive: ENT inspection nml, Pharynx nml, No signs of dehydration


Neck: positive: Nml inspection, No JVD, Trachea midline


Respiratory: positive: Chest non-tender, No respiratory distress, Breath sounds 

nml


Cardiovascular: positive: Regular rate & rhythm, No murmur


Peripheral Pulses: positive: 0


Abdomen: positive: Non-tender, Nml bowel sounds


Rectal: positive: Mass, Other (Exquisitely tender, visible and palpable mass in 

posterior perianal region bilaterally.  Mass slightly more prominent on the left

 than on the right.)


Back: negative: CVA tenderness (R), CVA tenderness (L)


Skin: positive: Color nml


Extremities: positive: Non-tender


Neurologic/Psychiatric: positive: Oriented x3





Results





- Lab Results


Lab results reviewed: Yes


Fish Bones: 


                                 06/23/21 11:34





                                 06/23/21 11:34


Other Lab Results: 





                               Lab Results x24hrs











  06/23/21 06/23/21 06/23/21 Range/Units





  11:34 11:34 11:25 


 


WBC   13.0 H   (4.8-10.8)  x10^3/uL


 


RBC   4.48 L   (4.70-6.10)  10^6/uL


 


Hgb   14.4   (14.0-18.0)  g/dL


 


Hct   43.9   (42.0-52.0)  %


 


MCV   98.0 H   (80.0-94.0)  fL


 


MCH   32.1 H   (27.0-31.0)  pg


 


MCHC   32.8   (32.0-36.0)  g/dL


 


RDW   12.8   (12.0-15.0)  %


 


Plt Count   251   (130-450)  10^3/uL


 


MPV   9.1   (7.4-11.4)  fL


 


Neut # (Auto)   10.3 H   (1.5-6.6)  10^3/uL


 


Lymph # (Auto)   1.7   (1.5-3.5)  10^3/uL


 


Mono # (Auto)   0.9   (0.0-1.0)  10^3/uL


 


Eos # (Auto)   0.0   (0.0-0.7)  10^3/uL


 


Baso # (Auto)   0.0   (0.0-0.1)  10^3/uL


 


Absolute Nucleated RBC   0.00   x10^3/uL


 


Nucleated RBC %   0.0   /100WBC


 


Sodium  134 L    (135-145)  mmol/L


 


Potassium  3.9    (3.5-5.0)  mmol/L


 


Chloride  101    (101-111)  mmol/L


 


Carbon Dioxide  25    (21-32)  mmol/L


 


Anion Gap  8.0    (6-13)  


 


BUN  13    (6-20)  mg/dL


 


Creatinine  1.0    (0.6-1.2)  mg/dL


 


Estimated GFR (MDRD)  73 L    (>89)  


 


Glucose  111 H    ()  mg/dL


 


Calcium  9.2    (8.5-10.3)  mg/dL


 


Total Bilirubin  0.5    (0.2-1.0)  mg/dL


 


AST  15    (10-42)  IU/L


 


ALT  16    (10-60)  IU/L


 


Alkaline Phosphatase  52    ()  IU/L


 


Total Protein  8.1    (6.7-8.2)  g/dL


 


Albumin  4.0    (3.2-5.5)  g/dL


 


Globulin  4.1    (2.1-4.2)  g/dL


 


Albumin/Globulin Ratio  1.0    (1.0-2.2)  


 


Lipase  22    (22-51)  U/L


 


Urine Color    YELLOW  


 


Urine Clarity    N  (CLEAR)  


 


Urine pH    6.0  (5.0-7.5)  PH


 


Ur Specific Gravity    1.020  (1.002-1.030)  


 


Urine Protein    NEGATIVE  (NEGATIVE)  mg/dL


 


Urine Glucose (UA)    NEGATIVE  (NEGATIVE)  mg/dL


 


Urine Ketones    NEGATIVE  (NEGATIVE)  mg/dL


 


Urine Occult Blood    NEGATIVE  (NEGATIVE)  


 


Urine Nitrite    NEGATIVE  (NEGATIVE)  


 


Urine Bilirubin    NEGATIVE  (NEGATIVE)  


 


Urine Urobilinogen    0.2 (NORMAL)  (NORMAL)  E.U./dL


 


Ur Leukocyte Esterase    NEGATIVE  (NEGATIVE)  


 


Ur Microscopic Review    NOT INDICATED  


 


Urine Culture Comments    NOT INDICATED  














- Diagnostic Imaging Results


Diagnostic Imaging Results: positive: Final report reviewed


Diagnostic Imaging Results Comments: 


IMPRESSION: 





1. Posterior horseshoe perianal abscess as described. Further evaluation for 

associated fistula may


be obtained with pelvic MRI if clinically indicated. 





2. Colonic diverticulosis without acute diverticulitis. 





Reviewed by: Tino Larsen MD on 6/23/2021 1:01 PM PDT 


Approved by: Tino Larsen MD on 6/23/2021 1:01 PM PDT 











Impression/Plan





- Problem List


Problem List: 





Perianal horse shoe abscess.  I have recommended examination under anesthesia 

with drainage of abscess and likely seton placement. We have discussed the risks

 and benefits of the procedure and the patient has expressed a desire to 

complete the procedure

## 2021-06-23 NOTE — ANESTHESIA
Pre-Anesthesia VS, & Labs





- Diagnosis





perirectal abcess





- Procedure





I&D perirectal abcess


Vital Signs: 





                                        











Temp Pulse Resp BP Pulse Ox


 


 36.8 C   63   14   108/64   93 


 


 06/23/21 09:18  06/23/21 14:18  06/23/21 14:18  06/23/21 14:18  06/23/21 14:18











Height: 5 ft 5 in


Weight (kg): 81.647 kg


Body Mass Index: 29.9


BMI Classification: Overweight





- NPO


>8 hours





- Lab Results


Current Lab Results: 





Laboratory Tests





06/23/21 11:34: Sodium 134 L, Potassium 3.9, Chloride 101, Carbon Dioxide 25, 

Anion Gap 8.0, BUN 13, Creatinine 1.0, Estimated GFR (MDRD) 73 L, Glucose 111 H,

Calcium 9.2, Total Bilirubin 0.5, AST 15, ALT 16, Alkaline Phosphatase 52, Total

Protein 8.1, Albumin 4.0, Globulin 4.1, Albumin/Globulin Ratio 1.0, Lipase 22


06/23/21 11:34: WBC 13.0 H, RBC 4.48 L, Hgb 14.4, Hct 43.9, MCV 98.0 H, MCH 32.1

H, MCHC 32.8, RDW 12.8, Plt Count 251, MPV 9.1, Neut # (Auto) 10.3 H, Lymph # 

(Auto) 1.7, Mono # (Auto) 0.9, Eos # (Auto) 0.0, Baso # (Auto) 0.0, Absolute 

Nucleated RBC 0.00, Nucleated RBC % 0.0








Fish Bones: 


                                 06/23/21 11:34





                                 06/23/21 11:34





Home Medications and Allergies





                                        





Finasteride 5 mg PO DAILY 09/08/17 


Tamsulosin HCl [Flomax] 0.4 mg ORAL DAILY 09/08/17 


lisinopriL [Lisinopril] 10 mg ORAL DAILY 09/08/17 


FLUoxetine [PROzac] 20 mg PO DAILY 11/18/20 








Allergies/Adverse Reactions: 


                                    Allergies











Allergy/AdvReac Type Severity Reaction Status Date / Time


 


codeine Allergy  Nausea Verified 06/23/21 09:22














Anes History & Medical History





- Anesthetic History


Anesthesia Complications: reports: No previous complications


Family history of Anesthesia Complications: Denies


Family history of Malignant Hyperthermia: Denies





- Medical History


Cardiovascular: reports: Hypertension


Pulmonary: reports: None


Gastrointestinal: reports: Hepatitis


Urinary: reports: Benign prostate hypertrophy


Musculoskeletal: reports: Other


Endocrine/Autoimmune: reports: None


Skin: reports: None


Smoking Status: Current every day smoker





- Surgical History


General: reports: Colonoscopy





Exam


General: Alert, Oriented x3, Cooperative


Dental: Partials Lower, Poor dentition


Mouth Opening: 3 Fingerbreadth


Neck Mobility: Normal


Mallampati classification: II


Respiratory: Lungs clear, Normal breath sounds, No respiratory distress


Cardiovascular: Regular rate


Neurological: Normal speech


Mental/Cognitive Status: Alert/Oriented X3, Normal for patient


Cognitive Status: Within normal limits





Plan


Anesthesia Type: General


Consent for Procedure(s) Verified and Reviewed: Yes


Code Status: Attempt Resuscitation


ASA classification: 2-Mild systemic disease


Is this case an emergency?: Yes

## 2021-06-23 NOTE — ED PHYSICIAN DOCUMENTATION
History of Present Illness





- Stated complaint


Stated Complaint: MALE 





- Chief complaint


Chief Complaint: Abd Pain





- Additonal information


Additional information: 


71 year old male presents with one week rectal pain that began after lifting h

eavy boxes.  no fevers.  painful to pass stool.  no hx of hemorrhoids.  has used

multiple OTC preps withotu relief.  no vomiting





hx of htn, BPH





meds: lisinopril, flomax, tamulosin, finasteride


0





Review of Systems


Constitutional: reports: Reviewed and negative


Ears: reports: Reviewed and negative


Throat: reports: Reviewed and negative


Cardiac: reports: Reviewed and negative


Respiratory: reports: Reviewed and negative


GI: reports: Constipation, Other (rectal pain)


: reports: Dysuria.  denies: Frequency, Hesitancy


Skin: reports: Reviewed and negative





PD PAST MEDICAL HISTORY





- Past Medical History


Cardiovascular: Hypertension


Respiratory: None


Endocrine/Autoimmune: None


GI: Hepatitis


: Benign prostate hypertrophy


HEENT: Chronic vision loss


Psych: Depression, Post traumatic stress disorder


Musculoskeletal: Other


Derm: None





- Past Surgical History


Past Surgical History: Yes


General: Colonoscopy





- Present Medications


Home Medications: 


                                Ambulatory Orders











 Medication  Instructions  Recorded  Confirmed


 


Finasteride 5 mg PO DAILY 09/08/17 11/17/20


 


Tamsulosin HCl [Flomax] 0.4 mg ORAL DAILY 09/08/17 11/17/20


 


lisinopriL [Lisinopril] 10 mg ORAL DAILY 09/08/17 11/17/20


 


FLUoxetine [PROzac] 20 mg PO DAILY 11/18/20 11/18/20














- Allergies


Allergies/Adverse Reactions: 


                                    Allergies











Allergy/AdvReac Type Severity Reaction Status Date / Time


 


codeine Allergy  Nausea Verified 06/23/21 09:22














- Social History


Does the pt smoke?: No


Smoking Status: Current every day smoker





PD ED PE EXPANDED





- General


General: Alert, In Pain





- Cardiac


Cardiac: Regular Rate, Radial strong equal, Pedal strong equal, Cap refill < 2 

sec





- Respiratory


Respiratory: Clear to ausultation ramirez.  No: Distress, Labored





- Abdomen


Abdomen: Normal Bowel sounds, Tender to palpation, Suprapubic (without guarding 

or rebound)





Results





- Vitals


Vitals: 


                               Vital Signs - 24 hr











  06/23/21 06/23/21





  09:18 14:18


 


Temperature 36.8 C 


 


Heart Rate 88 63


 


Respiratory 18 14





Rate  


 


Blood Pressure 174/78 H 108/64


 


O2 Saturation 95 93








                                     Oxygen











O2 Source                      Room air

















- Labs


Labs: 


                                Laboratory Tests











  06/23/21 06/23/21 06/23/21





  11:25 11:34 11:34


 


WBC   13.0 H 


 


RBC   4.48 L 


 


Hgb   14.4 


 


Hct   43.9 


 


MCV   98.0 H 


 


MCH   32.1 H 


 


MCHC   32.8 


 


RDW   12.8 


 


Plt Count   251 


 


MPV   9.1 


 


Neut # (Auto)   10.3 H 


 


Lymph # (Auto)   1.7 


 


Mono # (Auto)   0.9 


 


Eos # (Auto)   0.0 


 


Baso # (Auto)   0.0 


 


Absolute Nucleated RBC   0.00 


 


Nucleated RBC %   0.0 


 


Sodium    134 L


 


Potassium    3.9


 


Chloride    101


 


Carbon Dioxide    25


 


Anion Gap    8.0


 


BUN    13


 


Creatinine    1.0


 


Estimated GFR (MDRD)    73 L


 


Glucose    111 H


 


Calcium    9.2


 


Total Bilirubin    0.5


 


AST    15


 


ALT    16


 


Alkaline Phosphatase    52


 


Total Protein    8.1


 


Albumin    4.0


 


Globulin    4.1


 


Albumin/Globulin Ratio    1.0


 


Lipase    22


 


Urine Color  YELLOW  


 


Urine Clarity  N  


 


Urine pH  6.0  


 


Ur Specific Gravity  1.020  


 


Urine Protein  NEGATIVE  


 


Urine Glucose (UA)  NEGATIVE  


 


Urine Ketones  NEGATIVE  


 


Urine Occult Blood  NEGATIVE  


 


Urine Nitrite  NEGATIVE  


 


Urine Bilirubin  NEGATIVE  


 


Urine Urobilinogen  0.2 (NORMAL)  


 


Ur Leukocyte Esterase  NEGATIVE  


 


Ur Microscopic Review  NOT INDICATED  


 


Urine Culture Comments  NOT INDICATED  














PD MEDICAL DECISION MAKING





- ED course


Complexity details: reviewed results, re-evaluated patient, d/w patient


ED course: 





71-year-old male presents the emergency department for evaluation of 1 week 

perirectal pain after lifting heavy boxes. Patient presents with significant 

pain and the perianal area with a moderate amount of swelling and fluctuance.  

CT scan was completed and does show a very large perianal abscess.  This case 

was discussed with on-call surgeon Dr. Garcia who agrees to take the patient to 

same-day surgery for treatment of this abscess.  Screening labs do not reveal 

any significant leukocytosis.  Patient was n.p.o. from the time of arrival to 

the emergency department.





Departure





- Departure


Disposition: ED Transfer to [facility]


Clinical Impression: 


 Abscess, perianal





Condition: Serious

## 2021-06-23 NOTE — CT REPORT
PROCEDURE:  Abdomen/Pelvis W

 

INDICATIONS:  perirectal swelling; ? abscess

 

CONTRAST:  IV CONTRAST: Optiray 320 ml: 100 PO CONTRAST: *NO PO CONTRAST 

 

TECHNIQUE:  

After the administration of intravenous contrast, 5 mm thick sections acquired from the diaphragms to
 the symphysis.  5 mm thick coronal and sagittal reformats were acquired.  For radiation dose reducti
on, the following was used:  automated exposure control, adjustment of mA and/or kV according to chanda
ent size.  

 

COMPARISON:  None.

 

FINDINGS:  

Image quality:  Excellent.  

 

ABDOMEN:  

Lung bases: There is minimal dependent atelectasis. Heart size is normal.  

 

Solid organs: Mental Normal solid organs

 

Peritoneum and bowel: Small bowel loops demonstrate normal wall thickness and caliber.  The appendix 
is normal in appearance and extends to the base of a small right inguinal hernia. There is colonic di
verticulosis without acute diverticulitis. No intraperitoneal free fluid or air.  

 

Nodes and vessels:  No retroperitoneal or mesenteric adenopathy by size criteria.  Aorta and inferior
 vena cava are normal in size.  

 

Miscellaneous:  No ventral hernias.  

 

 

PELVIS:  

Genitourinary:  Bladder wall thickness is normal.  

 

Miscellaneous: There is a peripherally enhancing perianal horseshoe shaped perianal abscess measuring
 up to 4.2 x 3.6 x 3.9 cm. This extends along the 3-10 o'clock positions of the anus. There is adjace
nt subcutaneous fat stranding and involvement along the medial aspects of the gluteal cleft bilateral
ly, right greater than left.

 

There is a fat-containing right inguinal hernia with partial herniation of the appendix. No inguinal 
adenopathy.  

 

Bones:  No suspicious bony lesions.  No vertebral body compression fractures.  

 

IMPRESSION:  

 

1. Posterior horseshoe perianal abscess as described. Further evaluation for associated fistula may b
e obtained with pelvic MRI if clinically indicated.

 

2. Colonic diverticulosis without acute diverticulitis.

 

Reviewed by: Tino Larsen MD on 6/23/2021 1:01 PM PDT

Approved by: Tino Larsen MD on 6/23/2021 1:01 PM PDT

 

 

Station ID:  [id number]

## 2021-06-23 NOTE — OPERATIVE REPORT
Operative Report





- General


Procedure Date: 06/23/21


Planned Procedure: 





1.  Exam under anesthesia





2.  Pudendal block





3.  Planned incision and drainage





4.  Possible seton placement





5.  Other indicated procedure





Pre-Op Diagnosis: SIRS; presumed perianal sepsis; perirectal abscess


Procedure Performed: 





1.  Examined anesthesia





2.  Pudendal block





3.  Incision and drainage





4.  Modified Medhat procedure





5.  Seton placement





6.  Washout and debridement





7.  Anal and wound packing





Post Op Diagnosis: Same; horseshoe abscess with transphincteric fistula 

posterior.





- Procedure Note


Primary Surgeon: Anastacia


Secondary Surgeon: Carson


Anesthesia Provider: Kenrick


Anesthesia Technique: General ET tube, Local, Regional block


Estimated Blood Loss (mL): 75


Drain/Tube Type: Other (1.  Silastic vessel loop seton transphincteric within 

posterior anal canal  2.  Packing within anal canal of Gelfoam and Surgicel with

silk suture for retrieval  3.  Rt counterincision packed with Surgicel and half-

inch packing strip  4.  Posterior midline incision & Lt counterincision packed)


Indications: 





See EMR





Findings: 





1.  Transphincteric fistula noted posterior midline





2.  Large posterior abscess cavity communicating principally right lateral with 

left lateral extension as well





3.  Voluminous drainage of purulence with no residual fluctuance





4.  Posterior midline crypt drained





5.  Bilateral counterincisions communicating with posterior midline collection





6.  Hemostasis after packing





7.  Large volume urine output likely secondary to retention in the setting of 

perianal pelvic sepsis





Complications: 





No complications, sphincteric complex protected throughout








- Other


Other Information/Narrative: 





Please note Dr. Galaviz I work together on this case and I was present to assist 

her with management of this complicated presentation of horseshoe abscess with 

bilateral extension and transphincteric fistulous communication.





Anorectal exam:





Inspection:





   External Hemorrhoids -none


   Fissure in ano -none


   Erythema (perianal) -yes


   Other - bilateral fluctuance extending from posterior midline fluctuant mass





Palpation:





   Fluctuance -yes


   Palpable cord -yes


   Scar tissue -none


   Induration -yes





Digital Rectal Exam:





   Rectal Tone -appropriate


   Fluctuance -yes


   Sphincter -intact however fistulous communication noted posterior midline


   Masses -none





Anoscopy:





   Hemorrhoids - Grade II


   Bleeding -none


   Distal proctitis -none


   Other -large internal opening from transphincteric fistula from horseshoe 

abscess





Intraoperative findings:





1.  Transphincteric fistula noted posterior midline





2.  Large posterior abscess cavity communicating principally right lateral with 

left lateral extension as well





3.  Voluminous drainage of purulence with no residual fluctuance





4.  Posterior midline crypt drained





5.  Bilateral counterincisions communicating with posterior midline collection





6.  Hemostasis after packing





7.  Large volume urine output likely secondary to retention in the setting of 

perianal pelvic sepsis





Operative report:





The patient was taken to the operating room, placed supine on operating table. 

Bilateral lower extremity serial compression devices were placed. After informed

consent was confirmed and obtained, patient was induced for anesthesia as per 

above. The patient was placed in high lithotomy with candy-cane stirrups. 

Digital rectal findings are listed above as well. Patient was thereafter prepped

and draped in usual sterile fashion. A time-out was again called and agreed to 

by all in the room. Local was instilled for a perioperative block. Perioperative

antibiotics were administered as listed above within an hour of incision if the 

patient had not already been dosed preoperatively as scheduled.





Pudendal block performed at the beginning of this case.


 


Circumferential exam and evaluation of the perianal skin revealed findings as 

per above consistent with perianal sepsis with associated fluctuance concerning 

for an abscess in the posterior deep postanal space. Access to the space was 

achieved via midline incision between the coccyx and the anus spreading the 

superficial external muscle/sphincter to enter this deep space. Once 

decompressed, the fluid was sent for culture and sensitivity. The unique 

characteristics of the cavity are listed above under findings.





With the abscess decompressed, an opening was also made in the posterior midline

in the lower half of the internal sphincter muscle was divided to drain the anal

gland in which the infection originated. We thereafter proceeded to evaluate for

the presence of an associated fistulous communication for which there was a 

clinical suspicion. Thus, the fistula probe was inserted into the cavity which, 

together with diluted hydrogen peroxide, were utilized to identify the internal 

opening and if extravasation was clearly appreciated, it was used to guide the 

fistula probe towards intubating the track. Classification is listed above as 

are the findings.





The probe was thereafter used to thread a silastic-vessel loop to act as a malik

n; this was doubled on itself and sized to fit. Once evaluated for any other 

tracts, if any, or extension if present (see above for both) the vessel loops 

were appropriately sized using silk ligatures. The external openings were 

assured for appropriately opening and sent for pathology as well if indicated.





Counter-incisions were made over each issue anal fossa to allow appropriate 

drainage of the anterior extractions of the abscess. Please see above for the 

extent and location of these drainage sites.





1.  Silastic vessel loop seton transphincteric within posterior anal canal  





2.  Packing within anal canal of Gelfoam and Surgicel with silk suture for 

retrieval  





3.  Rt counterincision packed with Surgicel and half-inch packing strip  





4.  Posterior midline incision & Lt counterincision packed





Circumferential exam was again performed without any evidence of bleeding or 

other pathology. The patient tolerated the procedure well for which there was no

complication. All counts correct for sponges, needles, and instruments. The 

patient was taken to the post anesthesia care unit in stable condition. 





Valente catheter was kept in place.  Dry sterile gauze ABD pad and mesh panties 

for dressing.





Please note that voice recognition software was used to transcribe this note and

inadvertent errors might persist in spite of review and editing.  I am obliged 

to you for your attention.  I am thankful to you for allowing me to participate 

with you in this care of this patient.

## 2021-06-24 VITALS — DIASTOLIC BLOOD PRESSURE: 59 MMHG | SYSTOLIC BLOOD PRESSURE: 141 MMHG

## 2021-06-24 RX ADMIN — SODIUM CHLORIDE, PRESERVATIVE FREE SCH ML: 5 INJECTION INTRAVENOUS at 00:23

## 2021-06-24 RX ADMIN — KETOROLAC TROMETHAMINE SCH MG: 15 INJECTION, SOLUTION INTRAMUSCULAR; INTRAVENOUS at 13:39

## 2021-06-24 RX ADMIN — SODIUM CHLORIDE SCH MLS/HR: 9 INJECTION, SOLUTION INTRAVENOUS at 04:36

## 2021-06-24 RX ADMIN — KETOROLAC TROMETHAMINE SCH MG: 15 INJECTION, SOLUTION INTRAMUSCULAR; INTRAVENOUS at 00:17

## 2021-06-24 RX ADMIN — KETOROLAC TROMETHAMINE SCH MG: 15 INJECTION, SOLUTION INTRAMUSCULAR; INTRAVENOUS at 06:36

## 2021-06-24 RX ADMIN — SODIUM CHLORIDE, PRESERVATIVE FREE SCH ML: 5 INJECTION INTRAVENOUS at 09:12

## 2021-07-23 ENCOUNTER — HOSPITAL ENCOUNTER (OUTPATIENT)
Dept: HOSPITAL 76 - SDS | Age: 71
LOS: 1 days | Discharge: HOME | End: 2021-07-24
Attending: SURGERY
Payer: MEDICARE

## 2021-07-23 DIAGNOSIS — E66.9: ICD-10-CM

## 2021-07-23 DIAGNOSIS — F17.210: ICD-10-CM

## 2021-07-23 DIAGNOSIS — K60.5: Primary | ICD-10-CM

## 2021-07-23 PROCEDURE — 0DQP7ZZ REPAIR RECTUM, VIA NATURAL OR ARTIFICIAL OPENING: ICD-10-PCS | Performed by: SURGERY

## 2021-07-23 PROCEDURE — 0DJD8ZZ INSPECTION OF LOWER INTESTINAL TRACT, VIA NATURAL OR ARTIFICIAL OPENING ENDOSCOPIC: ICD-10-PCS | Performed by: SURGERY

## 2021-07-23 PROCEDURE — 46288 REPAIR ANAL FISTULA: CPT

## 2021-07-23 PROCEDURE — 45990 SURG DX EXAM ANORECTAL: CPT

## 2021-07-23 RX ADMIN — ACETAMINOPHEN SCH MLS/HR: 10 INJECTION, SOLUTION INTRAVENOUS at 12:49

## 2021-07-23 RX ADMIN — ACETAMINOPHEN SCH MLS/HR: 10 INJECTION, SOLUTION INTRAVENOUS at 23:48

## 2021-07-23 RX ADMIN — HYDROMORPHONE HYDROCHLORIDE PRN MG: 1 INJECTION, SOLUTION INTRAMUSCULAR; INTRAVENOUS; SUBCUTANEOUS at 10:49

## 2021-07-23 RX ADMIN — HYDROMORPHONE HYDROCHLORIDE PRN MG: 1 INJECTION, SOLUTION INTRAMUSCULAR; INTRAVENOUS; SUBCUTANEOUS at 14:35

## 2021-07-23 RX ADMIN — HYDROMORPHONE HYDROCHLORIDE PRN MG: 1 INJECTION, SOLUTION INTRAMUSCULAR; INTRAVENOUS; SUBCUTANEOUS at 21:45

## 2021-07-23 RX ADMIN — CIPROFLOXACIN SCH MLS/HR: 2 INJECTION, SOLUTION INTRAVENOUS at 19:03

## 2021-07-23 RX ADMIN — ACETAMINOPHEN SCH MLS/HR: 10 INJECTION, SOLUTION INTRAVENOUS at 17:59

## 2021-07-23 NOTE — OPERATIVE REPORT
Operative Report





- General


Procedure Date: 07/23/21


Planned Procedure: 





1.  Examined anesthesia





2.  Partial fistulotomy





3.  Debridement





4.  Endorectal advancement flap





5.  Rigid proctosigmoidoscopy





6.  Pudendal block





Pre-Op Diagnosis: Anal fistula; history of horseshoe abscess


Procedure Performed: 





1.  Examined anesthesia





2.  Partial fistulotomy





3.  Debridement





4.  Endorectal advancement flap





5.  Rigid proctosigmoidoscopy





6.  Pudendal block





Post Op Diagnosis: Same; viable endorectal advancement flap





- Procedure Note


Primary Surgeon: Carson


Secondary Surgeon: May


Anesthesia Provider: Kenrick


Pathology: 





1.  Internal opening of fistula





2.  External opening of fistula





Estimated Blood Loss (mL): 25


Indications: 





See EMR





Findings: 





See below





Complications: 





None








- Other


Other Information/Narrative: 





OPERATIVE REPORT: The patient was taken to operating room and confirmed for 

informed consent, which was noted and once bilateral lower extremities serial 

compression devices were placed, the patient was induced for general 

endotracheal anesthesia. 





Patient was placed in prone jackknife position.





After appropriate positioning, the patient was performed for rigid 

proctosigmoidoscopy with findings noted above. Once this was completed and the 

rectum was irrigated clear, the patient was prepped and draped in the usual 

sterile fashion.  Perioperative antibiotic dosed, gain time-out was again called

and agreed to by all in the room. The patient was instilled for 0.5% Marcaine 

and 2% lidocaine and thereafter was performed for circumferential exam which 

again pathology as noted above. Given presentation, patient was appropriate 

candidate for an endorectal advancement flap, which was performed as follows.  

Patient had a history of a horseshoe abscess with a posterior transphincteric 

fistula.





With a Frances retractor in place in order to provide appropriate exposure, a 

broad-based U-flap was delineated using a surgical marker with apex starting at 

least 1 cm below the level of the most distal internal opening of the fistula. 

Once this was completed, the apex of the flap was incised sharply and once 

appropriately dissected, 2 traction sutures were placed at either end for 

enhanced exposure. This was thereafter laterally divided along 2 sides sharply 

and then the flap was mobilized sharply using Metzenbaum and scalpel dissection.

It was assured during this very careful dissection that a broad-based tension-

free flap that was viable was successfully harvested containing both mucosa, 

submucosa and a portion of the internal sphinteric complex. 





Once the internal opening was passed, we continued the dissection proximally up 

until a point where enough mobility was achieved to allow for a tension-free 

closure above internal opening at the level of the dentate line. With the flap 

appropriately sized with the length suitable to cover the internal openings 

without any associated tension and the 2 traction sutures in place, the internal

opening and crypt bearing tissue around it were excised and cored out. Once this

was completed, these areas were aggressively debrided along their lengths with 

peroxide and peroxide soaked Nu Gauze which was "flossed" through both tracts to

the internal opening to appropriately and adequately debride it from any 

epithelial lining and assure for optimal closure. 





Once this was completed, we proceeded to begin securing the flap as follows. 

With hemostasis confirmed and achieved using electrocautery towards avoiding 

hematoma formation which could potentially compromise the flap, we closed the 

internal openings x2 with several interrupted of 2-0 Vicryl and having 

obliterated the internal openings, we proceeded to secure the endorectal mucosal

advancement flap as follows. 





Starting distally with alternating positioning of the Frances retractor, we slowly

secured the flap along its lateral facets again assuring that the flap would 

remain mobile and tension-free at the level of cam-dentate line overlying the 

obliterated internal openings. This was very carefully performed in an 

interrupted fashion with 2-0 Vicryl sutures, and again alternating from one side

to the other using the Frances retractor. Ultimately, when we approached the apex 

of the mobilized flap, the portion of the apex containing the internal opens 

were excised and sent for permanent pathology. With this completed, 

transfixation sutures were placed for the flap at its apex through to the cam-

dentate line at the distal anal canal again with adequate coverage of the 

obliterated internal openings. Thereafter, the facets on either side were 

completed for their closure and the wound again was checked for hemostasis, 

which was achieved. 





The external opening thereafter was addressed by enlarging it and sending tissue

for permanent pathology and this was performed towards enhancing drainage and 

assuring that there would be no premature closure that could result in recurrent

abscess potentially compromising the mucosal advancement flap.





With the Frances retractor removed and a circumferential exam again performed with

no evidence of active bleeding and no hematoma formation and a tension-free via

ble flap in place covering the obliterated internal opening, the patient 

thereafter was irrigated with no evidence of bleeding and thereafter was 

instilled for 266 mg of Exparel for perioperative and postop analgesia. This was

reconstituted to a total of 30 mL, which was injected circumferentially. Once 

accomplished, 2 sheets of Surgicel was placed in the anal canal and the patient 

was dressed with dry sterile gauze, and the mesh panties. All counts for 

sponges, needles, and instruments were correct at the conclusion of this 

operative case. The patient was extubated to the postanesthesia care unit, 

tolerated the procedure well for which there was no complication.

## 2021-07-23 NOTE — ANESTHESIA
Pre-Anesthesia VS, & Labs





- Diagnosis





anorectal fistula





- Procedure





EUA, Rectal Advancement flap


Vital Signs: 





                                        











Temp Pulse Resp BP Pulse Ox


 


 36.3 C L  72   14   153/84 H  98 


 


 21 06:34  21 06:34  21 06:34  21 06:34  21 06:34











Height: 5 ft 3 in


Weight (kg): 81.2 kg


Body Mass Index: 31.7


BMI Classification: Obese





- NPO


>8 hours





- Lab Results


Lab results reviewed: Yes





Home Medications and Allergies





                                        





Finasteride 5 mg PO DAILY 17 


Tamsulosin HCl [Flomax] 0.4 mg ORAL DAILY 17 


lisinopriL [Lisinopril] 10 mg ORAL DAILY 17 








Allergies/Adverse Reactions: 


                                    Allergies











Allergy/AdvReac Type Severity Reaction Status Date / Time


 


codeine Allergy  Nausea Verified 21 09:22














Anes History & Medical History





- Anesthetic History


Anesthesia Complications: reports: No previous complications


Family history of Anesthesia Complications: Denies


Family history of Malignant Hyperthermia: Denies





- Medical History


Cardiovascular: reports: Hypertension


Pulmonary: reports: None


Gastrointestinal: reports: Hepatitis (been treated)


Urinary: reports: Benign prostate hypertrophy


Musculoskeletal: reports: Other


Endocrine/Autoimmune: reports: None


Skin: reports: None


Smoking Status: Current every day smoker


Psychosocial: reports: Cannabis





- Surgical History


General: reports: Colonoscopy





Exam


General: Alert, Oriented x3, Cooperative, No acute distress


Dental: Dentures full Upper, Partials Lower


Mouth Openin Fingerbreadth


Neck Mobility: Normal


Mallampati classification: II


Respiratory: Lungs clear, Normal breath sounds, No respiratory distress, No 

accessory muscle use


Cardiovascular: Regular rate, Normal S1, Normal S2, No murmurs





Plan


Anesthesia Type: General


Consent for Procedure(s) Verified and Reviewed: Yes


Code Status: Attempt Resuscitation


ASA classification: 2-Mild systemic disease


Is this case an emergency?: No

## 2021-07-23 NOTE — ANESTHESIA POST OP EVALUATION
Anesthesia Post Eval





- Post Anesthesia Eval


Vitals: 





                                Last Vital Signs











Temp  36.6 C   07/23/21 12:00


 


Pulse  63   07/23/21 12:00


 


Resp  16   07/23/21 12:00


 


BP  156/68 H  07/23/21 12:00


 


Pulse Ox  96   07/23/21 12:00











CV Function Including HR & BP: Stable


Pain Control: Satisfactory


Nausea & Vomiting: Negative


Mental Status: Baseline


Respiratory Status: Airway Patent


Hydration Status: Satisfactory


Anesthesia Complications: None

## 2021-07-24 VITALS — DIASTOLIC BLOOD PRESSURE: 63 MMHG | SYSTOLIC BLOOD PRESSURE: 169 MMHG

## 2021-07-24 RX ADMIN — OXYCODONE PRN MG: 5 TABLET ORAL at 04:48

## 2021-07-24 RX ADMIN — OXYCODONE PRN MG: 5 TABLET ORAL at 09:00

## 2021-07-24 RX ADMIN — ACETAMINOPHEN SCH MLS/HR: 10 INJECTION, SOLUTION INTRAVENOUS at 05:31

## 2021-07-24 RX ADMIN — HYDROMORPHONE HYDROCHLORIDE PRN MG: 1 INJECTION, SOLUTION INTRAMUSCULAR; INTRAVENOUS; SUBCUTANEOUS at 01:20

## 2021-07-24 RX ADMIN — CIPROFLOXACIN SCH MLS/HR: 2 INJECTION, SOLUTION INTRAVENOUS at 07:00

## 2021-07-24 RX ADMIN — OXYCODONE PRN MG: 5 TABLET ORAL at 16:59

## 2021-07-24 RX ADMIN — ACETAMINOPHEN SCH MLS/HR: 10 INJECTION, SOLUTION INTRAVENOUS at 12:09

## 2023-02-28 ENCOUNTER — HOSPITAL ENCOUNTER (EMERGENCY)
Dept: HOSPITAL 76 - ED | Age: 73
Discharge: HOME | End: 2023-02-28
Payer: COMMERCIAL

## 2023-02-28 VITALS — SYSTOLIC BLOOD PRESSURE: 144 MMHG | DIASTOLIC BLOOD PRESSURE: 57 MMHG
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
## 2023-02-28 DIAGNOSIS — S01.512A: Primary | ICD-10-CM

## 2023-02-28 PROCEDURE — 41250 REPAIR TONGUE LACERATION: CPT

## 2023-02-28 PROCEDURE — 99283 EMERGENCY DEPT VISIT LOW MDM: CPT

## 2023-02-28 NOTE — ED PHYSICIAN DOCUMENTATION
PD HPI HEENT





- Stated complaint


Stated Complaint: TONGUE LAC





- Chief complaint


Chief Complaint: Heent





- History obtained from


History obtained from: Patient (He thinks he bit his tongue.  He has a 

persistently  bleeding lesion of the tip of the tongue.  This just happened.)





PD PAST MEDICAL HISTORY





- Past Medical History


Cardiovascular: Hypertension


Respiratory: None


Endocrine/Autoimmune: None


GI: Hepatitis


: Benign prostate hypertrophy


HEENT: Chronic vision loss


Psych: Depression, Post traumatic stress disorder


Musculoskeletal: Other


Derm: None





- Past Surgical History


Past Surgical History: Yes


General: Colonoscopy





- Present Medications


Home Medications: 


                                Ambulatory Orders











 Medication  Instructions  Recorded  Confirmed


 


Finasteride 5 mg PO DAILY 09/08/17 02/28/23


 


Tamsulosin HCl [Flomax] 0.4 mg ORAL DAILY 09/08/17 02/28/23


 


lisinopriL [Lisinopril] 10 mg ORAL DAILY 09/08/17 02/28/23


 


methocarbamoL [Robaxin] 500 mg PO Q6HR PRN #30 tablet 07/24/21 02/28/23


 


oxyCODONE [Roxicodone] 5 mg PO Q4H PRN #30 tablet 07/24/21 02/28/23


 


polyethylene glycoL 3350(BULK) 17 gm PO DAILY #238 ml 07/24/21 02/28/23





[Miralax]   














- Allergies


Allergies/Adverse Reactions: 


                                    Allergies











Allergy/AdvReac Type Severity Reaction Status Date / Time


 


codeine Allergy  Nausea Verified 02/28/23 21:26














- Social History


Does the pt smoke?: No


Smoking Status: Never smoker


Does the pt drink ETOH?: No


Does the pt have substance abuse?: No





- Immunizations


Immunizations are current?: No





- POLST


Patient has POLST: No





PD ED PE NORMAL





- Vitals


Vital signs reviewed: Yes





- General


General: Alert and oriented X 3, No acute distress





- HEENT


HEENT: Other (There is a pinpoint laceration to the right side of the tip of the

 tongue with active oozing.)





- Neuro


Neuro: Alert and oriented X 3, Normal speech





Results





- Vitals


Vitals: 


                               Vital Signs - 24 hr











  02/28/23





  21:22


 


Temperature 36.8 C


 


Heart Rate 81


 


Respiratory 19





Rate 


 


Blood Pressure 144/57 H


 


O2 Saturation 95








                                     Oxygen











O2 Source                      Room air

















Procedures





- Laceration (location)


  ** Tongue


Length in cm: 0.2


Wound type: Linear


Anesthesia: Lidocaine 1% with epi


Skin layer closure: Other (A single Vicryl 5-0 suture with hemostasis)


Other: Patient tolerated well, No complications, Neurovascular intact





Departure





- Departure


Disposition: 01 Home, Self Care


Clinical Impression: 


Tongue laceration


Qualifiers:


 Encounter type: initial encounter Qualified Code(s): S01.512A - Laceration 

without foreign body of oral cavity, initial encounter





Condition: Good


Record reviewed to determine appropriate education?: Yes


Instructions:  ED Laceration Mouth


Comments: 


You have a single absorbable suture in your tongue.  No specific wound care is 

necessary but you should probably eat a soft diet for the next couple of days.

## 2023-06-21 ENCOUNTER — HOSPITAL ENCOUNTER (OUTPATIENT)
Dept: HOSPITAL 76 - DI | Age: 73
Discharge: HOME | End: 2023-06-21
Attending: STUDENT IN AN ORGANIZED HEALTH CARE EDUCATION/TRAINING PROGRAM
Payer: MEDICARE

## 2023-06-21 DIAGNOSIS — I25.10: ICD-10-CM

## 2023-06-21 DIAGNOSIS — Z13.6: ICD-10-CM

## 2023-06-21 DIAGNOSIS — I77.819: ICD-10-CM

## 2023-06-21 DIAGNOSIS — F17.210: ICD-10-CM

## 2023-06-21 DIAGNOSIS — Z12.2: Primary | ICD-10-CM

## 2023-06-21 NOTE — CT REPORT
PROCEDURE:  Low Dose Lung Cancer Screen

 

INDICATIONS:  CURRENT SMOKER

 

TECHNIQUE:  

A CT scan of the chest was performed. Intravenous contrast media was not administered. Images were re
corded and evaluated at appropriate window settings. Reformats: axial MIP of the chest, coronal and s
agittal. For radiation dose reduction, the following was used: automated exposure control, adjustment
 of mA and/or kV according to patient size. 

 

COMPARISON:  None.

 

FINDINGS:  

Image quality: Good, allowing for low radiation dose

 

Lungs and pleura:No dense airspace disease. No pleural effusions. No suspicious pulmonary nodules. Mi
ld emphysematous changes.

 

Mediastinum, heart, and esophagus: Atherosclerotic calcifications are present. No hiatal hernia. Ther
e are coronary calcifications. No pathologic adenopathy by size criteria.

 

Chest wall and thyroid: Unremarkable.

 

Upper abdomen: No gross abnormality on this limited low-dose noncontrast images.

 

Bones: Degenerative changes without acute or suspicious osseous finding. Sclerotic appearance of the 
midthoracic vertebral bodies may be due to Modic changes. Attention on follow-up.

 

IMPRESSION:

Lungs RADS 1: Continue annual screening.

 

There are coronary calcifications. 

 

Reviewed by: Navdeep Shah MD on 6/21/2023 1:33 PM PDT

Approved by: Navdeep Shah MD on 6/21/2023 1:33 PM PDT

 

 

Station ID:  [id number]

## 2023-06-21 NOTE — ULTRASOUND REPORT
PROCEDURE:  Aorta Screening

 

INDICATIONS:  CURRENT SMOKER

 

TECHNIQUE:  Real time scanning was performed of the aorta and iliac arteries, with image documentatio
n.  

 

COMPARISON:  None.

 

FINDINGS:  

Aorta:  Proximal aortic diameter measures 2.8 x 2.4 cm.  Mid-aorta measures 1.9 x 1.7 cm.  Distal aor
tic diameter is 1.6 x 1.6 cm.  

 

Iliac arteries:  Right common iliac artery measures 1.3 x 1.4 cm.  Left common iliac artery measures 
1.5 x 1.3 cm.  

 

IMPRESSION:  

No abdominal aortic aneurysm. Ectatic proximal aorta, consider five-year follow-up.

 

Reviewed by: Navdeep Shah MD on 6/21/2023 1:43 PM PDT

Approved by: Navdeep Shah MD on 6/21/2023 1:43 PM PDT

 

 

Station ID:  [id number]